# Patient Record
(demographics unavailable — no encounter records)

---

## 2024-10-24 NOTE — HISTORY OF PRESENT ILLNESS
[7] : 7 [1] : 2 [Localized] : localized [Sharp] : sharp [Intermittent] : intermittent [Nothing helps with pain getting better] : Nothing helps with pain getting better [Exercising] : exercising [Student] : Work status: student [de-identified] : 10/24/2024 Mr. CEE REID, a 15 year old male, presents today for left ankle pain injured while playing football 10/23/24 has elevated and iced with no relief.  Mahad PULLIAM

## 2024-10-24 NOTE — PHYSICAL EXAM
[Left] : left foot and ankle [NL (40)] : plantar flexion 40 degrees [NL 30)] : inversion 30 degrees [NL (20)] : eversion 20 degrees [5___] : eversion 5[unfilled]/5 [] : no achilles tendon tenderness

## 2024-10-24 NOTE — DISCUSSION/SUMMARY
[de-identified] : 14yo male with sprain of ATFL of left ankle   1) out of gym, sports  2) x-rays reviewed - unremarkable  3) rest, ice, instructed to wear ankle brace  4) RTC in 10-14 days